# Patient Record
Sex: FEMALE | Race: WHITE | Employment: FULL TIME | URBAN - METROPOLITAN AREA
[De-identification: names, ages, dates, MRNs, and addresses within clinical notes are randomized per-mention and may not be internally consistent; named-entity substitution may affect disease eponyms.]

---

## 2024-09-18 ENCOUNTER — HOSPITAL ENCOUNTER (OUTPATIENT)
Dept: PHYSICAL THERAPY | Age: 29
Setting detail: THERAPIES SERIES
Discharge: HOME | End: 2024-09-18
Payer: COMMERCIAL

## 2024-09-18 PROCEDURE — 97161 PT EVAL LOW COMPLEX 20 MIN: CPT

## 2024-09-18 PROCEDURE — 97110 THERAPEUTIC EXERCISES: CPT

## 2024-09-24 ENCOUNTER — HOSPITAL ENCOUNTER (OUTPATIENT)
Dept: PHYSICAL THERAPY | Age: 29
Setting detail: THERAPIES SERIES
Discharge: HOME | End: 2024-09-24
Payer: COMMERCIAL

## 2024-09-25 ENCOUNTER — HOSPITAL ENCOUNTER (OUTPATIENT)
Dept: PHYSICAL THERAPY | Age: 29
Setting detail: THERAPIES SERIES
Discharge: HOME | End: 2024-09-25
Payer: COMMERCIAL

## 2024-09-25 PROCEDURE — 97140 MANUAL THERAPY 1/> REGIONS: CPT

## 2024-09-25 PROCEDURE — 97110 THERAPEUTIC EXERCISES: CPT

## 2024-09-25 ASSESSMENT — PAIN DESCRIPTION - DESCRIPTORS: DESCRIPTORS: ACHING

## 2024-09-25 ASSESSMENT — PAIN SCALES - GENERAL: PAINLEVEL_OUTOF10: 1

## 2024-09-25 ASSESSMENT — PAIN DESCRIPTION - ORIENTATION: ORIENTATION: RIGHT

## 2024-09-25 ASSESSMENT — PAIN DESCRIPTION - PAIN TYPE: TYPE: ACUTE PAIN

## 2024-09-25 ASSESSMENT — PAIN DESCRIPTION - LOCATION: LOCATION: SHOULDER

## 2024-09-26 ENCOUNTER — APPOINTMENT (OUTPATIENT)
Dept: PHYSICAL THERAPY | Age: 29
End: 2024-09-26
Payer: COMMERCIAL

## 2024-09-27 ENCOUNTER — HOSPITAL ENCOUNTER (OUTPATIENT)
Dept: PHYSICAL THERAPY | Age: 29
Setting detail: THERAPIES SERIES
Discharge: HOME | End: 2024-09-27
Payer: COMMERCIAL

## 2024-09-27 PROCEDURE — 97035 APP MDLTY 1+ULTRASOUND EA 15: CPT

## 2024-09-27 PROCEDURE — 97110 THERAPEUTIC EXERCISES: CPT

## 2024-09-27 ASSESSMENT — PAIN DESCRIPTION - LOCATION: LOCATION: SHOULDER

## 2024-09-27 ASSESSMENT — PAIN DESCRIPTION - DESCRIPTORS: DESCRIPTORS: TIGHTNESS

## 2024-09-27 ASSESSMENT — PAIN SCALES - GENERAL: PAINLEVEL_OUTOF10: 1

## 2024-09-27 ASSESSMENT — PAIN DESCRIPTION - ORIENTATION: ORIENTATION: RIGHT

## 2024-10-01 ENCOUNTER — HOSPITAL ENCOUNTER (OUTPATIENT)
Dept: PHYSICAL THERAPY | Age: 29
Setting detail: THERAPIES SERIES
Discharge: HOME | End: 2024-10-01
Payer: COMMERCIAL

## 2024-10-01 PROCEDURE — 97140 MANUAL THERAPY 1/> REGIONS: CPT

## 2024-10-01 PROCEDURE — 97110 THERAPEUTIC EXERCISES: CPT

## 2024-10-01 ASSESSMENT — PAIN DESCRIPTION - PAIN TYPE: TYPE: ACUTE PAIN

## 2024-10-01 ASSESSMENT — PAIN SCALES - GENERAL: PAINLEVEL_OUTOF10: 2

## 2024-10-01 ASSESSMENT — PAIN DESCRIPTION - DESCRIPTORS: DESCRIPTORS: ACHING

## 2024-10-01 ASSESSMENT — PAIN DESCRIPTION - LOCATION: LOCATION: SHOULDER

## 2024-10-01 ASSESSMENT — PAIN DESCRIPTION - ORIENTATION: ORIENTATION: RIGHT

## 2024-10-01 NOTE — PROGRESS NOTES
[address]   [place] [address]  Phone:[phone]      Physical TherapyTreatment Note        Date: 10/1/2024  Patient: Winter Callejas  : 1995   Confirmed: Yes  MRN: [id number]  Referring Provider: Pako Calvillo DO      Medical Diagnosis: Strain of muscle(s) and tendon(s) of the rotator cuff of right shoulder, initial encounter [S46.011A]  Medial epicondylitis, right elbow [M77.01]      Treatment Diagnosis: R shoulder pain, decreased R shoulder and thoracic AROM, decreased R shoulder strength, decreased posture, decreased activity tolerance, (+) R Francis-Manpreet and empty can    Visit Information:  Insurance: Payor: 3-HAB / Plan: 3-HAB / Product Type: *No Product type* /   PT Visit Information  PT Insurance Information: Claim #[id number]  Total # of Visits Approved: 12  Total # of Visits to Date: 4  Plan of Care/Certification Expiration Date: 10/25/24  No Show: 0  Progress Note Due Date: 10/25/24  Canceled Appointment: 0  Progress Note Counter: -    Subjective Information:  Subjective: Pt felt soreness after last session. Over the weekend felt a lot of pain inside the joint.  HEP Compliance:  [x] Good [] Fair [] Poor [] Reports not doing due to:    Pain Screening  Patient Currently in Pain: Yes  Pain Assessment: 0-10  Pain Level: 2  Pain Type: Acute pain  Pain Location: Shoulder  Pain Orientation: Right  Pain Descriptors: Aching    Treatment:  Exercises:  Exercises  Exercise 3: UBE*  Exercise 4: prone scap*  Exercise 5: stand rows/lats with GTB 10 x 3\" ea  Exercise 6: stand IR with double GTB x 10 / ER with single GTB x 10     (progressing to 90* abd position when ready*)  Exercise 7: 90* flex/abduction with light weight*  Exercise 8: chest pulls with GTB x 10  Exercise 9: R UT/LS stretches 3 x 20\"  Exercise 10: tricep extension with GTB x 10    Manual:   Manual Therapy  Soft Tissue Mobilizaton: R UT, LS  x8 mins  seated no lotion    Modalities:  Ultrasound (CPT

## 2024-10-03 ENCOUNTER — HOSPITAL ENCOUNTER (OUTPATIENT)
Dept: PHYSICAL THERAPY | Age: 29
Setting detail: THERAPIES SERIES
Discharge: HOME | End: 2024-10-03
Payer: COMMERCIAL

## 2024-10-03 PROCEDURE — 97110 THERAPEUTIC EXERCISES: CPT

## 2024-10-03 ASSESSMENT — PAIN DESCRIPTION - DESCRIPTORS: DESCRIPTORS: ACHING

## 2024-10-03 ASSESSMENT — PAIN DESCRIPTION - LOCATION: LOCATION: SHOULDER

## 2024-10-03 ASSESSMENT — PAIN DESCRIPTION - ORIENTATION: ORIENTATION: RIGHT

## 2024-10-03 ASSESSMENT — PAIN SCALES - GENERAL: PAINLEVEL_OUTOF10: 2

## 2024-10-03 ASSESSMENT — PAIN DESCRIPTION - PAIN TYPE: TYPE: ACUTE PAIN

## 2024-10-03 NOTE — PROGRESS NOTES
[address] [address]  Phone:[phone]      Physical TherapyTreatment Note        Date: 10/3/2024  Patient: Winter Callejas  : 1995   Confirmed: Yes  MRN: [id number]  Referring Provider: Pako Calvillo DO      Medical Diagnosis: Strain of muscle(s) and tendon(s) of the rotator cuff of right shoulder, initial encounter [S46.011A]  Medial epicondylitis, right elbow [M77.01]      Treatment Diagnosis: R shoulder pain, decreased R shoulder and thoracic AROM, decreased R shoulder strength, decreased posture, decreased activity tolerance, (+) R Francis-Manpreet and empty can    Visit Information:  Insurance: Payor: 3-HAB / Plan: 3-HAB / Product Type: *No Product type* /   PT Visit Information  PT Insurance Information: Claim #[id number]  Total # of Visits Approved: 12  Total # of Visits to Date: 5  Plan of Care/Certification Expiration Date: 10/25/24  No Show: 0  Progress Note Due Date: 10/25/24  Canceled Appointment: 0  Progress Note Counter: -    Subjective Information:  Subjective: Pt reported did fine from last session. Pain levels are minimal and ready to be just \"done\" with this. Has a f/u tomorrow with [employer] for workers comp claim. Notices increase pain when reaching overhead, and feels weak. Can not hold that position to long. Quick movements is easier for her to complete with less pain.  HEP Compliance:  [x] Good [] Fair [] Poor [] Reports not doing due to:    Pain Screening  Patient Currently in Pain: Yes  Pain Assessment: 0-10  Pain Level: 2  Pain Type: Acute pain  Pain Location: Shoulder  Pain Orientation: Right  Pain Descriptors: Aching    Treatment:  Exercises:  Exercises  Exercise 1: ER: 2# hand weight x 10  Exercise 2: shoulder flexion: abduction x 10 with 2# weight  Exercise 3: Objective measures taken ROM/MMT x 15 mins  Exercise 9: R UT/LS stretches 3 x 20\"    *Indicates exercise, modality, or manual techniques to be initiated when

## 2024-10-08 ENCOUNTER — TRANSCRIBE ORDERS (OUTPATIENT)
Dept: ADMINISTRATIVE | Age: 29
End: 2024-10-08

## 2024-10-08 DIAGNOSIS — S46.011A STRAIN OF TENDON OF RIGHT ROTATOR CUFF, INITIAL ENCOUNTER: Primary | ICD-10-CM

## 2024-10-10 ENCOUNTER — HOSPITAL ENCOUNTER (OUTPATIENT)
Dept: PHYSICAL THERAPY | Age: 29
Setting detail: THERAPIES SERIES
Discharge: HOME | End: 2024-10-10
Payer: COMMERCIAL

## 2024-10-10 PROCEDURE — 97110 THERAPEUTIC EXERCISES: CPT

## 2024-10-10 ASSESSMENT — PAIN DESCRIPTION - PAIN TYPE: TYPE: ACUTE PAIN

## 2024-10-10 ASSESSMENT — PAIN DESCRIPTION - DESCRIPTORS: DESCRIPTORS: BURNING

## 2024-10-10 ASSESSMENT — PAIN SCALES - GENERAL: PAINLEVEL_OUTOF10: 4

## 2024-10-10 ASSESSMENT — PAIN DESCRIPTION - ORIENTATION: ORIENTATION: RIGHT

## 2024-10-10 ASSESSMENT — PAIN DESCRIPTION - LOCATION: LOCATION: SHOULDER

## 2024-10-10 NOTE — PROGRESS NOTES
[address] [address]  Phone:[phone]      Physical TherapyTreatment Note        Date: 10/10/2024  Patient: Winter Callejas  : 1995   Confirmed: Yes  MRN: [id number]  Referring Provider: Pako Calvillo DO      Medical Diagnosis: Strain of muscle(s) and tendon(s) of the rotator cuff of right shoulder, initial encounter [S46.011A]  Medial epicondylitis, right elbow [M77.01]      Treatment Diagnosis: R shoulder pain, decreased R shoulder and thoracic AROM, decreased R shoulder strength, decreased posture, decreased activity tolerance, (+) R Francis-Manpreet and empty can    Visit Information:  Insurance: Payor: 3-HAB / Plan: 3-HAB / Product Type: *No Product type* /   PT Visit Information  PT Insurance Information: Claim #[id number]  Total # of Visits Approved: 12  Total # of Visits to Date: 6  Plan of Care/Certification Expiration Date: 10/25/24  No Show: 0  Progress Note Due Date: 10/25/24  Canceled Appointment: 0  Progress Note Counter: -    Subjective Information:  Subjective: Pt reported that her R shoudler has been hurting more lately and throbbing and a deep throb. Having a hard time reaching behind her back with the R arm.  HEP Compliance:  [] Good [x] Fair [] Poor [] Reports not doing due to:    Pain Screening  Patient Currently in Pain: Yes  Pain Assessment: 0-10  Pain Level: 4  Pain Type: Acute pain  Pain Location: Shoulder  Pain Orientation: Right  Pain Descriptors: Burning    Treatment:  Exercises:  Exercises  Exercise 1: ER  green tband x 15 R only  Exercise 4: overhead reach with 5# x 10  single arm 5# overhead in cupboard  Exercise 5: Row blue tband x 10  Exercise 9: R UT/LS stretches 3 x 20\"  Exercise 10: tricep extension with GTB x 10  Exercise 20: HEP: wall slides, sleeper stretch   *Indicates exercise, modality, or manual techniques to be initiated when appropriate    Objective Measures:     LTG 3 Current Status:: Compliant with current

## 2024-10-15 ENCOUNTER — HOSPITAL ENCOUNTER (OUTPATIENT)
Dept: PHYSICAL THERAPY | Age: 29
Setting detail: THERAPIES SERIES
Discharge: HOME | End: 2024-10-15
Payer: COMMERCIAL

## 2024-10-15 PROCEDURE — 97110 THERAPEUTIC EXERCISES: CPT

## 2024-10-15 PROCEDURE — 97035 APP MDLTY 1+ULTRASOUND EA 15: CPT

## 2024-10-15 ASSESSMENT — PAIN DESCRIPTION - LOCATION: LOCATION: SHOULDER

## 2024-10-15 ASSESSMENT — PAIN DESCRIPTION - DESCRIPTORS: DESCRIPTORS: BURNING

## 2024-10-15 ASSESSMENT — PAIN DESCRIPTION - ORIENTATION: ORIENTATION: RIGHT

## 2024-10-15 ASSESSMENT — PAIN SCALES - GENERAL: PAINLEVEL_OUTOF10: 3

## 2024-10-15 NOTE — PROGRESS NOTES
therapeutic exercise changes, additions or modifications this date.    Therapy Time:  PT Individual Minutes  Time In: 1040  Time Out: 1118  Minutes: 38  Timed Code Treatment Minutes: 38 Minutes  Modality Time In Time Out Total Minutes Units   Ther ex ([id number]) 1040 1112 32 2   Ultrasound  1112 1118 6 1      Electronically signed by Lindsey Gonzalez PTA on 10/15/24 at 11:25 AM EDT

## 2024-10-17 ENCOUNTER — HOSPITAL ENCOUNTER (OUTPATIENT)
Dept: PHYSICAL THERAPY | Age: 29
Setting detail: THERAPIES SERIES
Discharge: HOME | End: 2024-10-17
Payer: COMMERCIAL

## 2024-10-17 ENCOUNTER — HOSPITAL ENCOUNTER (OUTPATIENT)
Dept: MRI IMAGING | Age: 29
Discharge: HOME | End: 2024-10-19
Payer: COMMERCIAL

## 2024-10-17 DIAGNOSIS — S46.011A STRAIN OF TENDON OF RIGHT ROTATOR CUFF, INITIAL ENCOUNTER: ICD-10-CM

## 2024-10-17 PROCEDURE — 73221 MRI JOINT UPR EXTREM W/O DYE: CPT

## 2024-10-17 PROCEDURE — 97110 THERAPEUTIC EXERCISES: CPT

## 2024-10-17 ASSESSMENT — PAIN SCALES - GENERAL: PAINLEVEL_OUTOF10: 1

## 2024-10-17 ASSESSMENT — PAIN DESCRIPTION - LOCATION: LOCATION: SHOULDER

## 2024-10-17 ASSESSMENT — PAIN DESCRIPTION - ORIENTATION: ORIENTATION: RIGHT

## 2024-10-17 ASSESSMENT — PAIN DESCRIPTION - DESCRIPTORS: DESCRIPTORS: DULL

## 2024-10-22 ENCOUNTER — HOSPITAL ENCOUNTER (OUTPATIENT)
Dept: PHYSICAL THERAPY | Age: 29
Setting detail: THERAPIES SERIES
Discharge: HOME | End: 2024-10-22
Payer: COMMERCIAL

## 2024-10-22 PROCEDURE — 97110 THERAPEUTIC EXERCISES: CPT

## 2024-10-22 ASSESSMENT — PAIN DESCRIPTION - DESCRIPTORS: DESCRIPTORS: DULL

## 2024-10-22 ASSESSMENT — PAIN SCALES - GENERAL: PAINLEVEL_OUTOF10: 1

## 2024-10-22 ASSESSMENT — PAIN DESCRIPTION - ORIENTATION: ORIENTATION: RIGHT

## 2024-10-22 ASSESSMENT — PAIN DESCRIPTION - LOCATION: LOCATION: SHOULDER

## 2024-10-22 NOTE — PROGRESS NOTES
[address] [address]  Phone:[phone]      Physical TherapyTreatment Note        Date: 10/22/2024  Patient: Winter Callejas  : 1995   Confirmed: Yes  MRN: [id number]  Referring Provider: Pako Calvillo DO      Medical Diagnosis: Strain of muscle(s) and tendon(s) of the rotator cuff of right shoulder, initial encounter [S46.011A]  Medial epicondylitis, right elbow [M77.01]      Treatment Diagnosis: R shoulder pain, decreased R shoulder and thoracic AROM, decreased R shoulder strength, decreased posture, decreased activity tolerance, (+) R Francis-Manpreet and empty can    Visit Information:  Insurance: Payor: 3-HAB / Plan: 3-HAB / Product Type: *No Product type* /   PT Visit Information  PT Insurance Information: Claim #[id number]  Total # of Visits Approved: 12  Total # of Visits to Date: 9  Plan of Care/Certification Expiration Date: 10/25/24  No Show: 0  Progress Note Due Date: 10/25/24  Canceled Appointment: 0  Progress Note Counter:     Subjective Information:  Subjective: Pt reports waiting for results from MRI. Shoulder feeling better today. Pt reports partial compliance with HEP  HEP Compliance:  [] Good [x] Fair [] Poor [] Reports not doing due to:    Pain Screening  Patient Currently in Pain: Yes  Pain Assessment: 0-10  Pain Level: 1  Pain Location: Shoulder  Pain Orientation: Right  Pain Descriptors: Dull    Treatment:  Exercises:  Exercises  Exercise 1: ER  green tband x 15 R only  Exercise 2: prone scap: shoulder ext, horiz abd, low trap x 15 ea B 2#  Exercise 3: serratus reach with GTB x 15  Exercise 4: serratus reach fwd with RTB x 10 seated  Exercise 5: Rows/lats with BTB x 18  Exercise 6: Education: alignment, target muscle groups/expectations and compensation during ther ex provided 5 min.  Exercise 7: 90 deg flex/abduction with 4# x 15  Exercise 8: chest pulls with BTB x 15  Exercise 9: R UT/LS stretches 3 x 20\"  Exercise 20: HEP: serratus

## 2024-10-24 ENCOUNTER — HOSPITAL ENCOUNTER (OUTPATIENT)
Dept: PHYSICAL THERAPY | Age: 29
Setting detail: THERAPIES SERIES
Discharge: HOME | End: 2024-10-24
Payer: COMMERCIAL

## 2024-10-24 PROCEDURE — 97110 THERAPEUTIC EXERCISES: CPT

## 2024-10-24 NOTE — PROGRESS NOTES
[address] [address]  Phone:[phone]      Physical TherapyTreatment Note        Date: 10/24/2024  Patient: Winter Callejas  : 1995   Confirmed: Yes  MRN: [id number]  Referring Provider: Pako Calvillo DO  Medical Diagnosis: Strain of muscle(s) and tendon(s) of the rotator cuff of right shoulder, initial encounter [S46.011A]  Medial epicondylitis, right elbow [M77.01]   Treatment Diagnosis: R shoulder pain, decreased R shoulder and thoracic AROM, decreased R shoulder strength, decreased posture, decreased activity tolerance, (+) R Francis-Manpreet and empty can    Visit Information:  Insurance: Payor: 3-HAB / Plan: 3-HAB / Product Type: *No Product type* /   PT Visit Information  PT Insurance Information: Claim #[id number]  Total # of Visits Approved: 12  Total # of Visits to Date: 10  Plan of Care/Certification Expiration Date: 10/25/24  No Show: 0  Progress Note Due Date: 10/25/24  Canceled Appointment: 0  Progress Note Counter: 10/8-12    Subjective Information:  Subjective: Pt states that her shoulder is feeling ok today and hasn't been bothering her as much lately. It will gets stiff and locked up when she drives after work. She had her MRI and is scheduled to follow up with MD/[facility] tomorrow. Pt is hoping to get her restrictions lifted.  HEP Compliance:  [x] Good [] Fair  [] Poor [] Reports not doing due to:    Pain Screening  Patient Currently in Pain: Denies    Treatment:  Exercises:  Exercises  Exercise 1: ER  green tband x 15 R only  Exercise 2: prone scap: shoulder ext, horiz abd, low trap x 15 ea B 2#  Exercise 3: serratus reach with GTB x 15  Exercise 5: Rows/lats with BTB x 18  Exercise 7: 90 deg flex/abduction with 4# x 15  Exercise 8: chest pulls with BTB x 15  Treatment Reasoning  Functional ability(s) targeted: Reaching overhead      *Indicates exercise, modality, or manual techniques to be initiated when appropriate    Objective Measures:   LTG 
as she was told to not lift greater than 10# per restrictions.   Met     Body Structures, Functions, Activity Limitations Requiring Skilled Therapeutic Intervention: Decreased ROM, Decreased posture, Increased pain, Decreased strength, Decreased ADL status  Assessment: Pt has benefited from therapy with improvements in R shoulder ROM and strength. Pt is independent with HEP and will continue on her own at this point. DC from PT.  Therapy Prognosis: Excellent      PLAN: [x] Discharge   Frequency/Duration:  Additional Comments: DC from PT    Precautions: light duty at work; lifting no greater than 10 lbs                            Patient Status:[] Continue/ Initiate plan of Care     [x] Discharge PT.  Recommend pt continue with HEP.      [] Additional visits requested, Please re-certify for additional visits:     [] Hold     Objective information provided by: Electronically signed by Lindsey Gonzalez PTA on 10/24/24 at 2:19 PM EDT        Signature:     If you have any questions or concerns, please don't hesitate to call.  Thank you for your referral.    I have reviewed this plan of care and certify a need for medically necessary rehabilitation services.    Physician Signature:__________________________________________________________  Date:  Please sign and return

## 2024-11-04 ENCOUNTER — OFFICE VISIT (OUTPATIENT)
Dept: ORTHOPEDIC SURGERY | Age: 29
End: 2024-11-04

## 2024-11-04 VITALS
HEART RATE: 61 BPM | TEMPERATURE: 99.1 F | BODY MASS INDEX: 36.43 KG/M2 | OXYGEN SATURATION: 99 % | HEIGHT: 68 IN | WEIGHT: 240.4 LBS

## 2024-11-04 DIAGNOSIS — S43.431A PARALABRAL CYST OF RIGHT SHOULDER, INITIAL ENCOUNTER: Primary | ICD-10-CM

## 2024-11-04 DIAGNOSIS — M25.511 RIGHT SHOULDER PAIN, UNSPECIFIED CHRONICITY: ICD-10-CM

## 2024-11-04 DIAGNOSIS — M67.813 TENDINOSIS OF RIGHT ROTATOR CUFF: ICD-10-CM

## 2024-11-04 RX ORDER — LIDOCAINE HYDROCHLORIDE 10 MG/ML
5 INJECTION, SOLUTION INFILTRATION; PERINEURAL ONCE
Status: COMPLETED | OUTPATIENT
Start: 2024-11-04 | End: 2024-11-04

## 2024-11-04 RX ORDER — METHYLPREDNISOLONE ACETATE 80 MG/ML
80 INJECTION, SUSPENSION INTRA-ARTICULAR; INTRALESIONAL; INTRAMUSCULAR; SOFT TISSUE ONCE
Status: COMPLETED | OUTPATIENT
Start: 2024-11-04 | End: 2024-11-04

## 2024-11-04 RX ADMIN — LIDOCAINE HYDROCHLORIDE 5 ML: 10 INJECTION, SOLUTION INFILTRATION; PERINEURAL at 14:24

## 2024-11-04 RX ADMIN — METHYLPREDNISOLONE ACETATE 80 MG: 80 INJECTION, SUSPENSION INTRA-ARTICULAR; INTRALESIONAL; INTRAMUSCULAR; SOFT TISSUE at 14:23

## 2024-11-04 NOTE — PROGRESS NOTES
softball but her shoulder never really bothered her.  On her clinical exam she has full range of motion of the shoulder with good strength throughout her rotator cuff.  She has very minimal pain elicited on exam today.  I reviewed her MRI which does not show any acute findings.  I discussed with her that likely the injury at work flared up her shoulder of some underlying pathology.  She has a paralabral cyst which likely indicates a small glenoid labral tear as well as tendinosis of the rotator cuff.  Again none of these appear acute but rather chronic.  She is still having some discomfort in her shoulder.  Therefore we proceeded with a right shoulder cortisone injection in clinic today which she tolerated well.  I am okay with her increasing her activities as tolerated.  Based on her exam and MRI I do not necessarily have any restrictions for her.    Orders Placed This Encounter   Procedures    DRAIN/INJECT LARGE JOINT/BURSA    XR SHOULDER RIGHT (MIN 2 VIEWS)     Standing Status:   Future     Number of Occurrences:   1     Standing Expiration Date:   11/4/2025     Scheduling Instructions:      shana KNUTSON axillary     Order Specific Question:   Reason for exam:     Answer:   shoulder pain     Orders Placed This Encounter   Medications    lidocaine 1 % injection 5 mL    methylPREDNISolone acetate (DEPO-MEDROL) injection 80 mg     Return if symptoms worsen or fail to improve.    Rose Horton MD

## 2025-01-10 ENCOUNTER — OFFICE VISIT (OUTPATIENT)
Dept: ORTHOPEDIC SURGERY | Age: 30
End: 2025-01-10

## 2025-01-10 VITALS
BODY MASS INDEX: 35.77 KG/M2 | TEMPERATURE: 97.3 F | WEIGHT: 236 LBS | HEIGHT: 68 IN | HEART RATE: 73 BPM | OXYGEN SATURATION: 99 %

## 2025-01-10 DIAGNOSIS — M67.813 TENDINOSIS OF RIGHT ROTATOR CUFF: Primary | ICD-10-CM

## 2025-01-10 DIAGNOSIS — S43.431D PARALABRAL CYST OF RIGHT SHOULDER, SUBSEQUENT ENCOUNTER: ICD-10-CM

## 2025-01-10 PROCEDURE — 99213 OFFICE O/P EST LOW 20 MIN: CPT | Performed by: STUDENT IN AN ORGANIZED HEALTH CARE EDUCATION/TRAINING PROGRAM

## 2025-01-10 NOTE — PROGRESS NOTES
External rotation at side: 50. Internal rotation: Midthoracic. There is not pain with end ranges of motion. There is not crepitus.   Strength: 5/5 deltoid. 5/5 biceps. 5/5 triceps. 5/5 supraspinatus. 5/5 infraspinatus. 5/5 teres minor. Negative Hornblower's sign. Negative external rotation lag sign. 5/5 subscapularis. Negative belly press.   Special/provocative tests: Negative impingement signs. Neer impingement sign negative. Francis test negative. Negative Johnathan's test. There is not pain with cross body adduction. Negative Arnegard's test. Negative Speed's test.   Sensation intact to light touch along the C4-T1 distribution: normal.   Radial pulse is normal and symmetric.    Radiographs and Laboratory Studies:     Diagnostic Imaging Studies:      Three-view x-ray of the right shoulder dated 11//24 was independently reviewed by me demonstrates no acute fracture or significant arthritic changes.  The glenohumeral joint is concentrically reduced.     EXAMINATION:  MRI OF THE RIGHT SHOULDER WITHOUT CONTRAST 10/17/2024 3:22 pm     TECHNIQUE:  Multiplanar multisequence MRI of the right shoulder was performed without the  administration of intravenous contrast.     COMPARISON:  None.     HISTORY:  ORDERING SYSTEM PROVIDED HISTORY: Strain of tendon of right rotator cuff,  initial encounter  TECHNOLOGIST PROVIDED HISTORY:  What reading provider will be dictating this exam?->CRC     FINDINGS:  Some motion artifact is present.     There is moderate to severe tendinosis of the distal junctional supraspinatus  infraspinatus tendons without clear evidence of discrete tear.  The  subscapularis and teres minor tendons are unremarkable.  Rotator cuff muscle  signal and volume is normal.     The long head of the biceps tendon is intact.     No labral tear is clearly identified, however, a paralabral cyst is noted  along the anteroinferior glenoid measuring 1.8 x 0.7 x 2.2 cm.  There is no  glenohumeral joint effusion.  The